# Patient Record
Sex: FEMALE | Race: WHITE | Employment: FULL TIME | URBAN - METROPOLITAN AREA
[De-identification: names, ages, dates, MRNs, and addresses within clinical notes are randomized per-mention and may not be internally consistent; named-entity substitution may affect disease eponyms.]

---

## 2022-10-11 ENCOUNTER — HOSPITAL ENCOUNTER (OUTPATIENT)
Dept: MAMMOGRAPHY | Age: 37
Discharge: HOME | End: 2022-10-14
Payer: COMMERCIAL

## 2022-10-11 DIAGNOSIS — Z12.31 VISIT FOR SCREENING MAMMOGRAM: ICD-10-CM

## 2022-10-11 PROCEDURE — 77067 SCR MAMMO BI INCL CAD: CPT

## 2023-02-13 ENCOUNTER — OFFICE VISIT (OUTPATIENT)
Dept: OCCUPATIONAL MEDICINE | Age: 38
End: 2023-02-13

## 2023-02-13 VITALS — BODY MASS INDEX: 30.31 KG/M2 | WEIGHT: 193.13 LBS | HEIGHT: 67 IN

## 2023-02-13 DIAGNOSIS — Z00.00 WELLNESS EXAMINATION: Primary | ICD-10-CM

## 2023-02-13 RX ORDER — RIZATRIPTAN BENZOATE 10 MG/1
10 TABLET, ORALLY DISINTEGRATING ORAL
COMMUNITY
Start: 2016-08-11

## 2023-02-13 RX ORDER — FOLIC ACID 1 MG/1
1 TABLET ORAL DAILY
COMMUNITY
Start: 2021-03-18

## 2023-02-13 RX ORDER — ALBUTEROL SULFATE 90 UG/1
AEROSOL, METERED RESPIRATORY (INHALATION)
COMMUNITY
Start: 2023-01-30

## 2023-02-13 RX ORDER — PROMETHAZINE HYDROCHLORIDE 25 MG/1
25 TABLET ORAL EVERY 6 HOURS PRN
COMMUNITY
Start: 2022-10-08

## 2023-02-13 RX ORDER — CETIRIZINE HYDROCHLORIDE 10 MG/1
10 TABLET ORAL DAILY
COMMUNITY

## 2023-02-13 RX ORDER — AMITRIPTYLINE HYDROCHLORIDE 10 MG/1
TABLET, FILM COATED ORAL
COMMUNITY
Start: 2023-02-10

## 2023-03-27 ENCOUNTER — OFFICE VISIT (OUTPATIENT)
Dept: OCCUPATIONAL MEDICINE | Age: 38
End: 2023-03-27

## 2023-03-27 VITALS — RESPIRATION RATE: 18 BRPM | HEART RATE: 93 BPM | OXYGEN SATURATION: 100 % | TEMPERATURE: 98 F

## 2023-03-27 DIAGNOSIS — R09.89 UPPER RESPIRATORY SYMPTOM: Primary | ICD-10-CM

## 2023-03-27 DIAGNOSIS — J00 ACUTE RHINITIS: ICD-10-CM

## 2023-03-27 LAB
INFLUENZA A ANTIGEN, POC: NEGATIVE
INFLUENZA B ANTIGEN, POC: NEGATIVE
SARS-COV-2 RNA, POC: NEGATIVE

## 2023-03-27 ASSESSMENT — ENCOUNTER SYMPTOMS
CHEST TIGHTNESS: 0
DIARRHEA: 0
SINUS PRESSURE: 1
VOMITING: 0
RHINORRHEA: 0
SORE THROAT: 1
ABDOMINAL PAIN: 0
COUGH: 1
NAUSEA: 0

## 2023-03-27 NOTE — PROGRESS NOTES
PROGRESS NOTE    SUBJECTIVE:   Joan Evans is a 34 y.o. female seen for ____. Chief Complaint    URI         Patient presents to clinic with complaints of upper respiratory symptoms that began yesterday including scratchy throat, head pressure, congestion, cough, watery eyes, and congestion. She denies fever, chills, body aches, sick contacts, seasonal allergies. She states she took two Zyrtec yesterday and today, Ibuprofen today for her symptoms with some relief. Current Outpatient Medications   Medication Sig Dispense Refill    Multiple Vitamin (MULTIVITAMIN ADULT PO) Take 1 tablet by mouth      albuterol sulfate HFA (PROVENTIL;VENTOLIN;PROAIR) 108 (90 Base) MCG/ACT inhaler       amitriptyline (ELAVIL) 10 MG tablet       cetirizine (ZYRTEC) 10 MG tablet Take 10 mg by mouth daily      folic acid (FOLVITE) 1 MG tablet Take 1 tablet by mouth daily      promethazine (PHENERGAN) 25 MG tablet Take 25 mg by mouth every 6 hours as needed      rizatriptan (MAXALT-MLT) 10 MG disintegrating tablet Take 10 mg by mouth once as needed       No current facility-administered medications for this visit. Allergies   Allergen Reactions    Ciprofloxacin Other (See Comments)    Penicillin G Other (See Comments)     Brother had anaphylactic reaction to PCN. Pt was told to never take it  Brother had anaphylactic reaction to PCN. Pt was told to never take it  Patient was told she had a penicillin allergy as a baby. She does not know what type of reaction she had. Social History     Tobacco Use    Smoking status: Former     Types: Cigarettes    Smokeless tobacco: Never   Vaping Use    Vaping Use: Some days   Substance Use Topics    Alcohol use: Yes    Drug use: Never        Review of Systems   Constitutional:  Negative for chills and fever. HENT:  Positive for congestion, sinus pressure (frontal), sneezing and sore throat (scratchy). Negative for ear discharge, ear pain, postnasal drip and rhinorrhea.

## 2023-03-30 ENCOUNTER — OFFICE VISIT (OUTPATIENT)
Dept: OCCUPATIONAL MEDICINE | Age: 38
End: 2023-03-30

## 2023-03-30 VITALS — TEMPERATURE: 97.8 F | OXYGEN SATURATION: 99 % | HEART RATE: 104 BPM | RESPIRATION RATE: 18 BRPM

## 2023-03-30 DIAGNOSIS — J01.40 ACUTE NON-RECURRENT PANSINUSITIS: Primary | ICD-10-CM

## 2023-03-30 RX ORDER — DOXYCYCLINE HYCLATE 100 MG
100 TABLET ORAL 2 TIMES DAILY
Qty: 10 TABLET | Refills: 0 | Status: SHIPPED | OUTPATIENT
Start: 2023-03-30 | End: 2023-04-04

## 2023-03-30 ASSESSMENT — ENCOUNTER SYMPTOMS
NAUSEA: 0
COUGH: 1
SINUS PRESSURE: 1
DIARRHEA: 0
SORE THROAT: 0
VOMITING: 0
ABDOMINAL PAIN: 0
CHEST TIGHTNESS: 0
SHORTNESS OF BREATH: 0

## 2023-03-30 NOTE — PROGRESS NOTES
No submental or submandibular adenopathy. Cervical: No cervical adenopathy. Skin:     General: Skin is warm and dry. Neurological:      Mental Status: She is alert and oriented to person, place, and time. Psychiatric:         Attention and Perception: Attention and perception normal.         Mood and Affect: Mood and affect normal.         Speech: Speech normal.         Behavior: Behavior normal. Behavior is cooperative. Thought Content: Thought content normal.         Cognition and Memory: Cognition and memory normal.         Judgment: Judgment normal.       ASSESSMENT and PLAN    Tanvi was seen today for follow-up. Diagnoses and all orders for this visit:    Acute non-recurrent pansinusitis  -     doxycycline hyclate (VIBRA-TABS) 100 MG tablet; Take 1 tablet by mouth 2 times daily for 5 days    Discussed with patient that based on history of present illness and persistence of symptoms despite OTC/home management/interventions, antibiotic therapy reasonable at this time. Educated to take as prescribed in its entirety with food to reduce GI upset. Risks, benefits, side effects discussed. Encouraged to continue OTC/home interventions that we previously discussed including Zyrtec and Flonase daily, avoidance of allergens, anti-inflammatory and decongestant conservatively as needed, increased fluid intake, cool mist humidifier, warm steam showers, elevating head of bed at night, cough/throat lozenges, warm fluids with honey. Educated on symptoms that would require more urgent evaluation including but not limited to worsening of symptoms despite antibiotic therapy, fever, shortness of breath, chest tightness. Encouraged to utilize clinic as needed or urgent care/ED if outside of clinic hours. Patient verbalized understanding and is agreeable with the outlined plan of care.      Patient counseled on benefits of having a primary care provider which includes, but is not limited to, continuity of care

## 2023-05-18 ENCOUNTER — OFFICE VISIT (OUTPATIENT)
Dept: OCCUPATIONAL MEDICINE | Age: 38
End: 2023-05-18

## 2023-05-18 VITALS
OXYGEN SATURATION: 97 % | TEMPERATURE: 97.8 F | HEART RATE: 94 BPM | SYSTOLIC BLOOD PRESSURE: 99 MMHG | DIASTOLIC BLOOD PRESSURE: 82 MMHG

## 2023-05-18 DIAGNOSIS — Z00.00 WELLNESS EXAMINATION: Primary | ICD-10-CM

## 2023-05-18 NOTE — PROGRESS NOTES
PROGRESS NOTE    SUBJECTIVE:   Abhishek Grover is a 34 y.o. female seen for quarterly wellness exam.    Chief Complaint    Other         Patient presents to clinic for quarterly wellness screening as required by employer to receive incentive. Patient voices no complaints or concerns at this time. Patient states she has been trying to increase her water intake and is currently drinking 3-4 16 oz water bottles a day. She has been trying to cut out sodas and been successful with that goal. She states since cutting out sodas she has lost some weight and would like to continue this goal.         Current Outpatient Medications   Medication Sig Dispense Refill    Multiple Vitamin (MULTIVITAMIN ADULT PO) Take 1 tablet by mouth      albuterol sulfate HFA (PROVENTIL;VENTOLIN;PROAIR) 108 (90 Base) MCG/ACT inhaler       amitriptyline (ELAVIL) 10 MG tablet       cetirizine (ZYRTEC) 10 MG tablet Take 10 mg by mouth daily      folic acid (FOLVITE) 1 MG tablet Take 1 tablet by mouth daily      promethazine (PHENERGAN) 25 MG tablet Take 25 mg by mouth every 6 hours as needed      rizatriptan (MAXALT-MLT) 10 MG disintegrating tablet Take 10 mg by mouth once as needed       No current facility-administered medications for this visit. Allergies   Allergen Reactions    Ciprofloxacin Other (See Comments)    Penicillin G Other (See Comments)     Brother had anaphylactic reaction to PCN. Pt was told to never take it  Brother had anaphylactic reaction to PCN. Pt was told to never take it  Patient was told she had a penicillin allergy as a baby. She does not know what type of reaction she had. Social History     Tobacco Use    Smoking status: Former     Types: Cigarettes    Smokeless tobacco: Never   Vaping Use    Vaping Use: Some days   Substance Use Topics    Alcohol use: Yes    Drug use: Never        Review of Systems   All other systems reviewed and are negative.        OBJECTIVE:  BP 99/82 (Site: Left Upper Arm,

## 2023-08-10 ENCOUNTER — OFFICE VISIT (OUTPATIENT)
Dept: OCCUPATIONAL MEDICINE | Age: 38
End: 2023-08-10

## 2023-08-10 VITALS — OXYGEN SATURATION: 97 % | SYSTOLIC BLOOD PRESSURE: 116 MMHG | DIASTOLIC BLOOD PRESSURE: 80 MMHG | HEART RATE: 100 BPM

## 2023-08-10 DIAGNOSIS — Z00.00 WELLNESS EXAMINATION: Primary | ICD-10-CM

## 2023-08-10 NOTE — PROGRESS NOTES
PROGRESS NOTE    SUBJECTIVE:   Elza Paz is a 34 y.o. female seen for quarterly wellness exam.    Chief Complaint    Other         HPI    Patient presents to clinic for quarterly wellness screening as required by employer to receive incentive. Patient voices no complaints or concerns at this time. Patient states she is working on cutting out sodas. She tries to only drink sodas when she has a migraine, because the caffeine helps with her symptoms. She is treated by her PCP for migraines with amitriptyline and rizatriptan, which she states give her some relief when she has the migraines. Current Outpatient Medications   Medication Sig Dispense Refill    Multiple Vitamin (MULTIVITAMIN ADULT PO) Take 1 tablet by mouth      albuterol sulfate HFA (PROVENTIL;VENTOLIN;PROAIR) 108 (90 Base) MCG/ACT inhaler       amitriptyline (ELAVIL) 10 MG tablet       cetirizine (ZYRTEC) 10 MG tablet Take 10 mg by mouth daily      folic acid (FOLVITE) 1 MG tablet Take 1 tablet by mouth daily      promethazine (PHENERGAN) 25 MG tablet Take 25 mg by mouth every 6 hours as needed      rizatriptan (MAXALT-MLT) 10 MG disintegrating tablet Take 10 mg by mouth once as needed       No current facility-administered medications for this visit. Allergies   Allergen Reactions    Ciprofloxacin Other (See Comments)    Penicillin G Other (See Comments)     Brother had anaphylactic reaction to PCN. Pt was told to never take it  Brother had anaphylactic reaction to PCN. Pt was told to never take it  Patient was told she had a penicillin allergy as a baby. She does not know what type of reaction she had. Social History     Tobacco Use    Smoking status: Former     Types: Cigarettes    Smokeless tobacco: Never   Vaping Use    Vaping Use: Some days   Substance Use Topics    Alcohol use: Yes    Drug use: Never        Review of Systems   All other systems reviewed and are negative.        OBJECTIVE:  /80   Pulse 100

## 2023-10-16 ENCOUNTER — OFFICE VISIT (OUTPATIENT)
Dept: OCCUPATIONAL MEDICINE | Age: 38
End: 2023-10-16

## 2023-10-16 VITALS
DIASTOLIC BLOOD PRESSURE: 82 MMHG | HEIGHT: 67 IN | WEIGHT: 185.2 LBS | OXYGEN SATURATION: 99 % | SYSTOLIC BLOOD PRESSURE: 116 MMHG | HEART RATE: 98 BPM | BODY MASS INDEX: 29.07 KG/M2

## 2023-10-16 DIAGNOSIS — Z00.00 WELLNESS EXAMINATION: Primary | ICD-10-CM

## 2023-12-13 ENCOUNTER — OFFICE VISIT (OUTPATIENT)
Dept: OCCUPATIONAL MEDICINE | Age: 38
End: 2023-12-13

## 2023-12-13 VITALS
TEMPERATURE: 97.7 F | DIASTOLIC BLOOD PRESSURE: 84 MMHG | OXYGEN SATURATION: 99 % | RESPIRATION RATE: 16 BRPM | SYSTOLIC BLOOD PRESSURE: 124 MMHG | HEART RATE: 80 BPM

## 2023-12-13 DIAGNOSIS — B97.89 SORE THROAT (VIRAL): ICD-10-CM

## 2023-12-13 DIAGNOSIS — J06.9 VIRAL UPPER RESPIRATORY TRACT INFECTION: Primary | ICD-10-CM

## 2023-12-13 DIAGNOSIS — J02.8 SORE THROAT (VIRAL): ICD-10-CM

## 2023-12-13 DIAGNOSIS — G43.909 MIGRAINE WITHOUT STATUS MIGRAINOSUS, NOT INTRACTABLE, UNSPECIFIED MIGRAINE TYPE: ICD-10-CM

## 2023-12-13 RX ORDER — RIZATRIPTAN BENZOATE 10 MG/1
10 TABLET, ORALLY DISINTEGRATING ORAL PRN
Qty: 9 TABLET | Refills: 0 | Status: SHIPPED | OUTPATIENT
Start: 2023-12-13

## 2023-12-13 ASSESSMENT — ENCOUNTER SYMPTOMS
WHEEZING: 0
DIARRHEA: 0
SORE THROAT: 1
ABDOMINAL PAIN: 0
CHEST TIGHTNESS: 1
EYE PAIN: 1
RHINORRHEA: 1
SHORTNESS OF BREATH: 0
SINUS PAIN: 0
VOMITING: 0
COUGH: 1
NAUSEA: 1

## 2024-03-07 ENCOUNTER — OFFICE VISIT (OUTPATIENT)
Age: 39
End: 2024-03-07

## 2024-03-07 VITALS
SYSTOLIC BLOOD PRESSURE: 108 MMHG | DIASTOLIC BLOOD PRESSURE: 78 MMHG | WEIGHT: 192.6 LBS | HEART RATE: 90 BPM | HEIGHT: 66 IN | TEMPERATURE: 98.1 F | BODY MASS INDEX: 30.95 KG/M2 | RESPIRATION RATE: 16 BRPM | OXYGEN SATURATION: 100 %

## 2024-03-07 DIAGNOSIS — Z00.00 WELLNESS EXAMINATION: Primary | ICD-10-CM

## 2024-03-07 NOTE — PROGRESS NOTES
PROGRESS NOTE    SUBJECTIVE:   Tanvi Major is a 39 y.o. female seen for ____.    Chief Complaint    Wellness Program         HPI  Patient presents to clinic for quarterly wellness screening as required by employer to receive incentive. Patient voices no complaints or concerns at this time. Pt expresses her health and wellness goals for this year are to increase physical activity to 2-3 times per week and decrease intake of sweets to lose some weight and feel more \"comfortable\" in her clothes. She states her stress level is somewhat elevated due to some changes at work, but overall it is managable. She states she is sleeping well, feels well rested in the morning and regularly gets a total of 7 hours of sleep per night.       Current Outpatient Medications   Medication Sig Dispense Refill    rizatriptan (MAXALT-MLT) 10 MG disintegrating tablet Take 1 tablet by mouth as needed for Migraine Do not take more than 10 in one month 9 tablet 0    Multiple Vitamin (MULTIVITAMIN ADULT PO) Take 1 tablet by mouth      albuterol sulfate HFA (PROVENTIL;VENTOLIN;PROAIR) 108 (90 Base) MCG/ACT inhaler       cetirizine (ZYRTEC) 10 MG tablet Take 10 mg by mouth daily      folic acid (FOLVITE) 1 MG tablet Take 1 tablet by mouth daily      promethazine (PHENERGAN) 25 MG tablet Take 25 mg by mouth every 6 hours as needed       No current facility-administered medications for this visit.      Allergies   Allergen Reactions    Ciprofloxacin Other (See Comments)    Penicillin G Other (See Comments)     Brother had anaphylactic reaction to PCN.  Pt was told to never take it  Brother had anaphylactic reaction to PCN.  Pt was told to never take it  Patient was told she had a penicillin allergy as a baby. She does not know what type of reaction she had.         Social History     Tobacco Use    Smoking status: Former     Types: Cigarettes    Smokeless tobacco: Never   Vaping Use    Vaping Use: Some days   Substance Use Topics    Alcohol

## 2024-06-20 ENCOUNTER — OFFICE VISIT (OUTPATIENT)
Age: 39
End: 2024-06-20

## 2024-06-20 VITALS
DIASTOLIC BLOOD PRESSURE: 68 MMHG | SYSTOLIC BLOOD PRESSURE: 112 MMHG | BODY MASS INDEX: 30.95 KG/M2 | HEART RATE: 87 BPM | HEIGHT: 66 IN | RESPIRATION RATE: 16 BRPM | OXYGEN SATURATION: 97 % | WEIGHT: 192.6 LBS

## 2024-06-20 DIAGNOSIS — Z00.00 WELLNESS EXAMINATION: Primary | ICD-10-CM

## 2024-06-20 PROBLEM — G89.29 CHRONIC PAIN OF LEFT KNEE: Status: RESOLVED | Noted: 2018-04-11 | Resolved: 2024-06-20

## 2024-06-20 PROBLEM — E53.8 FOLATE DEFICIENCY: Status: ACTIVE | Noted: 2020-10-22

## 2024-06-20 PROBLEM — R73.03 PREDIABETES: Status: RESOLVED | Noted: 2018-04-11 | Resolved: 2024-06-20

## 2024-06-20 PROBLEM — E53.8 VITAMIN B12 DEFICIENCY: Status: ACTIVE | Noted: 2020-10-22

## 2024-06-20 PROBLEM — I95.9 HYPOTENSION: Status: RESOLVED | Noted: 2020-10-22 | Resolved: 2024-06-20

## 2024-06-20 PROBLEM — K85.10 GALLSTONE PANCREATITIS: Status: RESOLVED | Noted: 2018-12-13 | Resolved: 2024-06-20

## 2024-06-20 PROBLEM — J30.9 ALLERGIC RHINITIS: Status: RESOLVED | Noted: 2021-09-28 | Resolved: 2024-06-20

## 2024-06-20 PROBLEM — R05.3 CHRONIC COUGH: Status: RESOLVED | Noted: 2022-04-15 | Resolved: 2024-06-20

## 2024-06-20 PROBLEM — R53.83 FATIGUE: Status: RESOLVED | Noted: 2018-04-11 | Resolved: 2024-06-20

## 2024-06-20 PROBLEM — M25.562 CHRONIC PAIN OF LEFT KNEE: Status: RESOLVED | Noted: 2018-04-11 | Resolved: 2024-06-20

## 2024-06-20 PROBLEM — E66.9 CLASS 1 OBESITY WITH BODY MASS INDEX (BMI) OF 33.0 TO 33.9 IN ADULT: Status: RESOLVED | Noted: 2018-04-11 | Resolved: 2024-06-20

## 2024-06-20 PROBLEM — G43.109 MIGRAINE WITH AURA AND WITHOUT STATUS MIGRAINOSUS, NOT INTRACTABLE: Status: ACTIVE | Noted: 2017-06-29

## 2024-06-20 PROBLEM — E66.811 CLASS 1 OBESITY WITH BODY MASS INDEX (BMI) OF 33.0 TO 33.9 IN ADULT: Status: RESOLVED | Noted: 2018-04-11 | Resolved: 2024-06-20

## 2024-06-20 PROBLEM — F43.22 ADJUSTMENT DISORDER WITH ANXIETY: Status: RESOLVED | Noted: 2019-06-13 | Resolved: 2024-06-20

## 2024-06-20 NOTE — PROGRESS NOTES
PROGRESS NOTE    SUBJECTIVE /HPI  Tanvi Major is a 39 y.o. female who presents to the onsite wellness clinic for her quarterly wellness screening as required by her employer to receive an incentive. She voices no complaints or concerns at this time. She has decreased her soda intake and is now down to one 7oz  can of soda per day.       Chief Complaint    Quarterly Wellness Review             Current Outpatient Medications   Medication Sig Dispense Refill    rizatriptan (MAXALT-MLT) 10 MG disintegrating tablet Take 1 tablet by mouth as needed for Migraine Do not take more than 10 in one month 9 tablet 0    cetirizine (ZYRTEC) 10 MG tablet Take 1 tablet by mouth daily      promethazine (PHENERGAN) 25 MG tablet Take 1 tablet by mouth every 6 hours as needed      Multiple Vitamin (MULTIVITAMIN ADULT PO) Take 1 tablet by mouth (Patient not taking: Reported on 6/20/2024)      albuterol sulfate HFA (PROVENTIL;VENTOLIN;PROAIR) 108 (90 Base) MCG/ACT inhaler  (Patient not taking: Reported on 6/20/2024)      folic acid (FOLVITE) 1 MG tablet Take 1 tablet by mouth daily (Patient not taking: Reported on 6/20/2024)       No current facility-administered medications for this visit.      Allergies   Allergen Reactions    Ciprofloxacin Other (See Comments)    Penicillin G Other (See Comments)     Brother had anaphylactic reaction to PCN.  Pt was told to never take it  Brother had anaphylactic reaction to PCN.  Pt was told to never take it  Patient was told she had a penicillin allergy as a baby. She does not know what type of reaction she had.         Social History     Tobacco Use    Smoking status: Former     Types: Cigarettes    Smokeless tobacco: Never   Vaping Use    Vaping Use: Some days   Substance Use Topics    Alcohol use: Yes    Drug use: Never        Review of Systems   Constitutional: Negative.    Respiratory: Negative.     Cardiovascular: Negative.           OBJECTIVE:  /68 (Site: Right Upper Arm,

## 2024-07-25 ENCOUNTER — OFFICE VISIT (OUTPATIENT)
Age: 39
End: 2024-07-25

## 2024-07-25 VITALS
HEIGHT: 66 IN | BODY MASS INDEX: 30.89 KG/M2 | SYSTOLIC BLOOD PRESSURE: 102 MMHG | WEIGHT: 192.2 LBS | DIASTOLIC BLOOD PRESSURE: 78 MMHG

## 2024-07-25 DIAGNOSIS — Z00.8 ENCOUNTER FOR BIOMETRIC SCREENING: Primary | ICD-10-CM

## 2024-07-25 ASSESSMENT — ENCOUNTER SYMPTOMS: RESPIRATORY NEGATIVE: 1

## 2024-07-25 NOTE — PROGRESS NOTES
PROGRESS NOTE    SUBJECTIVEHPI:   Tanvi Major is a 39 y.o. female seen in the onsite clinic at her place of employment for an annual biometric screening. 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         Current Outpatient Medications   Medication Sig Dispense Refill    rizatriptan (MAXALT-MLT) 10 MG disintegrating tablet Take 1 tablet by mouth as needed for Migraine Do not take more than 10 in one month 9 tablet 0    promethazine (PHENERGAN) 25 MG tablet Take 1 tablet by mouth every 6 hours as needed       No current facility-administered medications for this visit.      Allergies   Allergen Reactions    Ciprofloxacin Other (See Comments)    Penicillin G Other (See Comments)     Brother had anaphylactic reaction to PCN.  Pt was told to never take it  Brother had anaphylactic reaction to PCN.  Pt was told to never take it  Patient was told she had a penicillin allergy as a baby. She does not know what type of reaction she had.         Social History     Tobacco Use    Smoking status: Former     Types: Cigarettes    Smokeless tobacco: Never   Vaping Use    Vaping Use: Some days   Substance Use Topics    Alcohol use: Yes    Drug use: Never        Review of Systems   Constitutional: Negative.    Respiratory: Negative.     Cardiovascular: Negative.    Psychiatric/Behavioral:          Increased stress due to work changes          OBJECTIVE:  /78 (Site: Left Upper Arm, Position: Sitting, Cuff Size: Medium Adult)   Ht 1.683 m (5' 6.25\")   Wt 87.2 kg (192 lb 3.2 oz)   BMI 30.79 kg/m²    Additional Measurements    07/25/24 0831   Waist (Inches): 40 in         Physical Exam  Vitals reviewed.   Constitutional:       General: She is awake. She is not in acute distress.     Appearance: Normal appearance. She is well-developed and well-groomed.   Pulmonary:      Effort: Pulmonary effort is normal.   Skin:

## 2024-07-26 LAB
ALBUMIN SERPL-MCNC: 4.1 G/DL (ref 3.9–4.9)
ALP SERPL-CCNC: 85 IU/L (ref 44–121)
ALT SERPL-CCNC: 16 IU/L (ref 0–32)
AST SERPL-CCNC: 15 IU/L (ref 0–40)
BASOPHILS # BLD AUTO: 0 X10E3/UL (ref 0–0.2)
BASOPHILS NFR BLD AUTO: 0 %
BILIRUB SERPL-MCNC: 0.3 MG/DL (ref 0–1.2)
BUN SERPL-MCNC: 8 MG/DL (ref 6–24)
BUN/CREAT SERPL: 10 (ref 9–23)
CALCIUM SERPL-MCNC: 8.9 MG/DL (ref 8.7–10.2)
CHLORIDE SERPL-SCNC: 106 MMOL/L (ref 96–106)
CHOLEST SERPL-MCNC: 239 MG/DL (ref 100–199)
CHOLEST/HDLC SERPL: 6.1 RATIO (ref 0–4.4)
CO2 SERPL-SCNC: 19 MMOL/L (ref 20–29)
CREAT SERPL-MCNC: 0.8 MG/DL (ref 0.57–1)
EGFRCR SERPLBLD CKD-EPI 2021: 95 ML/MIN/1.73
EOSINOPHIL # BLD AUTO: 0.1 X10E3/UL (ref 0–0.4)
EOSINOPHIL NFR BLD AUTO: 1 %
ERYTHROCYTE [DISTWIDTH] IN BLOOD BY AUTOMATED COUNT: 13.1 % (ref 11.7–15.4)
GLOBULIN SER CALC-MCNC: 3 G/DL (ref 1.5–4.5)
GLUCOSE SERPL-MCNC: 73 MG/DL (ref 70–99)
HBA1C MFR BLD: 5.3 % (ref 4.8–5.6)
HCT VFR BLD AUTO: 42 % (ref 34–46.6)
HDLC SERPL-MCNC: 39 MG/DL
HGB BLD-MCNC: 14 G/DL (ref 11.1–15.9)
IMM GRANULOCYTES # BLD AUTO: 0 X10E3/UL (ref 0–0.1)
IMM GRANULOCYTES NFR BLD AUTO: 0 %
LDLC SERPL CALC-MCNC: 157 MG/DL (ref 0–99)
LYMPHOCYTES # BLD AUTO: 2.2 X10E3/UL (ref 0.7–3.1)
LYMPHOCYTES NFR BLD AUTO: 30 %
MCH RBC QN AUTO: 28.5 PG (ref 26.6–33)
MCHC RBC AUTO-ENTMCNC: 33.3 G/DL (ref 31.5–35.7)
MCV RBC AUTO: 86 FL (ref 79–97)
MONOCYTES # BLD AUTO: 0.5 X10E3/UL (ref 0.1–0.9)
MONOCYTES NFR BLD AUTO: 7 %
NEUTROPHILS # BLD AUTO: 4.4 X10E3/UL (ref 1.4–7)
NEUTROPHILS NFR BLD AUTO: 62 %
PLATELET # BLD AUTO: 293 X10E3/UL (ref 150–450)
POTASSIUM SERPL-SCNC: 4.2 MMOL/L (ref 3.5–5.2)
PROT SERPL-MCNC: 7.1 G/DL (ref 6–8.5)
RBC # BLD AUTO: 4.91 X10E6/UL (ref 3.77–5.28)
SODIUM SERPL-SCNC: 139 MMOL/L (ref 134–144)
TRIGL SERPL-MCNC: 233 MG/DL (ref 0–149)
TSH SERPL DL<=0.005 MIU/L-ACNC: 2.71 UIU/ML (ref 0.45–4.5)
VLDLC SERPL CALC-MCNC: 43 MG/DL (ref 5–40)
WBC # BLD AUTO: 7.2 X10E3/UL (ref 3.4–10.8)

## 2024-08-01 ENCOUNTER — OFFICE VISIT (OUTPATIENT)
Age: 39
End: 2024-08-01

## 2024-08-01 DIAGNOSIS — Z00.00 WELLNESS EXAMINATION: Primary | ICD-10-CM

## 2024-08-01 DIAGNOSIS — E78.5 HYPERLIPIDEMIA, UNSPECIFIED HYPERLIPIDEMIA TYPE: ICD-10-CM

## 2024-08-01 NOTE — PROGRESS NOTES
orders placed or performed in visit on 07/25/24   Comprehensive Metabolic Panel   Result Value Ref Range    Glucose 73 70 - 99 mg/dL    BUN 8 6 - 24 mg/dL    Creatinine 0.80 0.57 - 1.00 mg/dL    EstJoshua Filt Rate 95 >59 mL/min/1.73    BUN/Creatinine Ratio 10 9 - 23    Sodium 139 134 - 144 mmol/L    Potassium 4.2 3.5 - 5.2 mmol/L    Chloride 106 96 - 106 mmol/L    CO2 19 (L) 20 - 29 mmol/L    Calcium 8.9 8.7 - 10.2 mg/dL    Total Protein 7.1 6.0 - 8.5 g/dL    Albumin 4.1 3.9 - 4.9 g/dL    Globulin, Total 3.0 1.5 - 4.5 g/dL    Total Bilirubin 0.3 0.0 - 1.2 mg/dL    Alkaline Phosphatase 85 44 - 121 IU/L    AST 15 0 - 40 IU/L    ALT 16 0 - 32 IU/L   CBC with Auto Differential   Result Value Ref Range    WBC 7.2 3.4 - 10.8 x10E3/uL    RBC 4.91 3.77 - 5.28 x10E6/uL    Hemoglobin 14.0 11.1 - 15.9 g/dL    Hematocrit 42.0 34.0 - 46.6 %    MCV 86 79 - 97 fL    MCH 28.5 26.6 - 33.0 pg    MCHC 33.3 31.5 - 35.7 g/dL    RDW 13.1 11.7 - 15.4 %    Platelets 293 150 - 450 x10E3/uL    Neutrophils % 62 Not Estab. %    Lymphocytes % 30 Not Estab. %    Monocytes % 7 Not Estab. %    Eosinophils % 1 Not Estab. %    Basophils % 0 Not Estab. %    Neutrophils Absolute 4.4 1.4 - 7.0 x10E3/uL    Lymphocytes Absolute 2.2 0.7 - 3.1 x10E3/uL    Monocytes Absolute 0.5 0.1 - 0.9 x10E3/uL    Eosinophils Absolute 0.1 0.0 - 0.4 x10E3/uL    Basophils Absolute 0.0 0.0 - 0.2 x10E3/uL    Immature Granulocytes % 0 Not Estab. %    Immature Grans (Abs) 0.0 0.0 - 0.1 x10E3/uL   Lipid Panel W/ Chol/HDL Ratio   Result Value Ref Range    Cholesterol, Total 239 (H) 100 - 199 mg/dL    Triglycerides 233 (H) 0 - 149 mg/dL    HDL 39 (L) >39 mg/dL    VLDL Cholesterol Calculated 43 (H) 5 - 40 mg/dL    LDL Cholesterol 157 (H) 0 - 99 mg/dL    Chol/HDL Ratio 6.1 (H) 0.0 - 4.4 ratio   Hemoglobin A1C   Result Value Ref Range    Hemoglobin A1C 5.3 4.8 - 5.6 %   Thyroid Cascade Profile   Result Value Ref Range    TSH 2.710 0.450 - 4.500 uIU/mL         Physical Exam  Vitals

## 2024-08-02 ASSESSMENT — ENCOUNTER SYMPTOMS: RESPIRATORY NEGATIVE: 1

## 2024-10-28 ENCOUNTER — OFFICE VISIT (OUTPATIENT)
Age: 39
End: 2024-10-28

## 2024-10-28 VITALS — DIASTOLIC BLOOD PRESSURE: 64 MMHG | SYSTOLIC BLOOD PRESSURE: 102 MMHG

## 2024-10-28 DIAGNOSIS — Z00.00 WELLNESS EXAMINATION: Primary | ICD-10-CM

## 2024-10-28 DIAGNOSIS — Z76.0 MEDICATION REFILL: ICD-10-CM

## 2024-10-28 DIAGNOSIS — G43.901 MIGRAINE WITH STATUS MIGRAINOSUS, NOT INTRACTABLE, UNSPECIFIED MIGRAINE TYPE: ICD-10-CM

## 2024-10-28 RX ORDER — RIZATRIPTAN BENZOATE 10 MG/1
10 TABLET, ORALLY DISINTEGRATING ORAL PRN
Qty: 9 TABLET | Refills: 0 | Status: SHIPPED | OUTPATIENT
Start: 2024-10-28

## 2024-10-28 ASSESSMENT — ENCOUNTER SYMPTOMS: RESPIRATORY NEGATIVE: 1

## 2024-10-28 NOTE — PROGRESS NOTES
wellness program with Tanvi. Discussed all pertinent clinical examination findings/results with her applicable. she voiced no questions or concerns at present. Encouraged to return to clinic as needed.     I have reviewed the Tanvi medication list, past medical, family, social, and surgical history in detail and updated the patient record appropriately.    Judy Chance, APRN - CNP

## 2024-10-28 NOTE — PATIENT INSTRUCTIONS
Increased intake of fruits and vegetables is linked to a lower risk of premature death as well as heart disease and stroke.  High intake of fruits and vegetables may reduce the risk of developing cancer. Work on increasing your fruit and vegetable servings to at least five servings daily.     Whole-grain foods (like 100 percent whole-wheat bread, steel cut oats, and whole-grain pasta) are healthier choices than foods made with refined grains (like white bread and white rice). Regularly eating whole grains has been shown to help with weight control and to lower the risk of a range of conditions, including colorectal cancer, cardiovascular disease, and diabetes.     Healthy fats -- There are different types of dietary fat; in terms of health benefits, the type of fat in the diet is more important than the overall amount. \"Healthy\" fats are monounsaturated or polyunsaturated fats. These are found in nuts, seeds, avocados, and nut butters, as well as olive, canola, and sesame oil. Eating foods with healthy fats, while avoiding or limiting foods with saturated and trans fat may reduce the risk of heart disease.     Added sugars -- Eating foods with a lot of added sugars, including sugar-sweetened beverages like fruit drinks, sugary sodas, sweet tea, and sports drinks, increases the risk of heart disease, diabetes, and high blood pressure. It also contributes to weight gain. While 100 percent fruit juice has natural sugars and some extra nutrients, it has around as many calories as sugary soda. Drinking water and then eating whole fruit instead is the best choice.     Processed foods -- Processed foods include pre-packaged items like snack foods, frozen meals, and instant soups. They are often high in refined grains, sugar, unhealthy fats, and salt, and low in other important nutrients. Eating a lot of processed foods may increase the risk of heart disease, diabetes, fatty liver disease, and cancer.

## 2025-02-24 ENCOUNTER — OFFICE VISIT (OUTPATIENT)
Age: 40
End: 2025-02-24

## 2025-02-24 VITALS
HEART RATE: 92 BPM | SYSTOLIC BLOOD PRESSURE: 102 MMHG | TEMPERATURE: 98.3 F | DIASTOLIC BLOOD PRESSURE: 70 MMHG | RESPIRATION RATE: 16 BRPM | OXYGEN SATURATION: 98 %

## 2025-02-24 DIAGNOSIS — M25.512 ACUTE PAIN OF LEFT SHOULDER: Primary | ICD-10-CM

## 2025-02-24 DIAGNOSIS — R52 PAIN AGGRAVATED BY EATING OR DRINKING: ICD-10-CM

## 2025-02-24 LAB — GLUCOSE, POC: 111 MG/DL

## 2025-02-24 ASSESSMENT — ENCOUNTER SYMPTOMS
SHORTNESS OF BREATH: 0
ABDOMINAL PAIN: 0
BACK PAIN: 0
BLOOD IN STOOL: 0
DIARRHEA: 0
CHEST TIGHTNESS: 0
VOMITING: 1
NAUSEA: 1
RESPIRATORY NEGATIVE: 1
WHEEZING: 0
RECTAL PAIN: 0
EYES NEGATIVE: 1
CONSTIPATION: 0
ABDOMINAL DISTENTION: 0
ANAL BLEEDING: 0

## 2025-02-24 NOTE — PROGRESS NOTES
[employer]  ONSITE CLINIC  PROGRESS NOTE    SUBJECTIVE:   Tanvi Major is a 40 y.o. female seen here in the onsite clinic at her place of employment, [employer]. she has a Primary Care Provider that she sees regularly, Mulvihill, Joseph, MD.      Chief Complaint    Shoulder Pain           Shoulder Pain   Pertinent negatives include no fever or numbness.     Tanvi reports intermittent pain in her left shoulder after eating. She reports the pain started about one month ago and occurs approximately once a day. It starts about 5 minutes after she finishes eating and last for about 10-15 minutes and occasionally has some nausea and vomits with the pain, but not consistently. Other then eating no other causative factors known. The last time the pain accord was after lunch today, 1.5 hours ago, she denies pain currently. For lunch she had a spinach dip and corn chips. She is not treating the pain and it just resolves on its own. The pain does not radiate and she denies a history of hypertension or  heart disease. She reports having borderline blood sugars years ago that are now controled with a healthier diet.    Denies known injury, weakness, numbness/tingling, shortness of breath, chest pain, palpitations, fever/chills, reflux/heartburn, abdominal pain/diarrhea/constipation/bloating     Current Outpatient Medications   Medication Sig Dispense Refill    rizatriptan (MAXALT-MLT) 10 MG disintegrating tablet Take 1 tablet by mouth as needed for Migraine if symptoms persist or return, may repeat dose after >=2 hours. Do not take more than 2 tablets in 24 hours. Limit use to <10 days per month to avoid medication-overuse headache. 9 tablet 0    promethazine (PHENERGAN) 25 MG tablet Take 1 tablet by mouth every 6 hours as needed (Patient not taking: Reported on 2/24/2025)       No current facility-administered medications for this visit.      Allergies   Allergen Reactions    Ciprofloxacin Other (See

## 2025-02-27 ENCOUNTER — OFFICE VISIT (OUTPATIENT)
Age: 40
End: 2025-02-27

## 2025-02-27 DIAGNOSIS — Z01.89 ROUTINE LAB DRAW: ICD-10-CM

## 2025-02-27 DIAGNOSIS — Z90.49 HISTORY OF CHOLECYSTECTOMY: ICD-10-CM

## 2025-02-27 DIAGNOSIS — R10.9 ABDOMINAL PAIN, UNSPECIFIED ABDOMINAL LOCATION: Primary | ICD-10-CM

## 2025-02-27 NOTE — PROGRESS NOTES
[facility]  ONSITE CLINIC  PROGRESS NOTE    SUBJECTIVE:   Tanvi Major is a 40 y.o. female seen here in the onsite clinic at her place of employment, [employer]. she does have a Primary Care Provider that she sees regularly, Mulvihill, Joseph, MD. She was seen here on Monday and the NP encouraged her to return to clinic for fasting labs and to call her Primary Care Provider for further exam. She hasn't called yet because she wanted to see if she wanted to see if her labs showed anything. She likes her Primary Care Provider but has to take 1/2 day off of work when she sees him because he is in [place]. Her symptoms include left shoulder pain after eating that starts about 5 minutes after she finishes eating and last for about 10-15 minutes. She sometimes also has bad nausea approximately 2 hours after eating that is resolved with vomiting. She is fasting this morning and has no pain, nausea, vomiting. Her exam on Monday was unremarkable. She had her gallbladder removed on 12/14/2018 2 days after being admitted to the hospital for gallstone pancreatitis. Prior to that she never had problems with her gallbladder or stomach so was never established with a GI doctor.     Chief Complaint    Labs Only       Current Outpatient Medications   Medication Sig Dispense Refill    rizatriptan (MAXALT-MLT) 10 MG disintegrating tablet Take 1 tablet by mouth as needed for Migraine if symptoms persist or return, may repeat dose after >=2 hours. Do not take more than 2 tablets in 24 hours. Limit use to <10 days per month to avoid medication-overuse headache. 9 tablet 0    promethazine (PHENERGAN) 25 MG tablet Take 1 tablet by mouth every 6 hours as needed (Patient not taking: Reported on 2/24/2025)       No current facility-administered medications for this visit.      Allergies   Allergen Reactions    Ciprofloxacin Other (See Comments)    Penicillin G Other (See Comments)     Brother had anaphylactic

## 2025-02-28 ENCOUNTER — TELEPHONE (OUTPATIENT)
Age: 40
End: 2025-02-28

## 2025-02-28 VITALS — DIASTOLIC BLOOD PRESSURE: 64 MMHG | SYSTOLIC BLOOD PRESSURE: 102 MMHG

## 2025-02-28 LAB
ALBUMIN SERPL-MCNC: 4.4 G/DL (ref 3.9–4.9)
ALP SERPL-CCNC: 80 IU/L (ref 44–121)
ALT SERPL-CCNC: 10 IU/L (ref 0–32)
AST SERPL-CCNC: 13 IU/L (ref 0–40)
BILIRUB DIRECT SERPL-MCNC: 0.13 MG/DL (ref 0–0.4)
BILIRUB SERPL-MCNC: 0.5 MG/DL (ref 0–1.2)
BUN SERPL-MCNC: 10 MG/DL (ref 6–24)
BUN/CREAT SERPL: 12 (ref 9–23)
CALCIUM SERPL-MCNC: 8.9 MG/DL (ref 8.7–10.2)
CHLORIDE SERPL-SCNC: 106 MMOL/L (ref 96–106)
CHOLEST SERPL-MCNC: 233 MG/DL (ref 100–199)
CHOLEST/HDLC SERPL: 5.3 RATIO (ref 0–4.4)
CO2 SERPL-SCNC: 19 MMOL/L (ref 20–29)
CREAT SERPL-MCNC: 0.81 MG/DL (ref 0.57–1)
EGFRCR SERPLBLD CKD-EPI 2021: 93 ML/MIN/1.73
GLUCOSE SERPL-MCNC: 77 MG/DL (ref 70–99)
HDLC SERPL-MCNC: 44 MG/DL
LDLC SERPL CALC-MCNC: 163 MG/DL (ref 0–99)
POTASSIUM SERPL-SCNC: 4.4 MMOL/L (ref 3.5–5.2)
PROT SERPL-MCNC: 7.3 G/DL (ref 6–8.5)
SODIUM SERPL-SCNC: 139 MMOL/L (ref 134–144)
TRIGL SERPL-MCNC: 145 MG/DL (ref 0–149)
VLDLC SERPL CALC-MCNC: 26 MG/DL (ref 5–40)

## 2025-02-28 ASSESSMENT — ENCOUNTER SYMPTOMS
RESPIRATORY NEGATIVE: 1
NAUSEA: 0
VOMITING: 0

## 2025-02-28 NOTE — PATIENT INSTRUCTIONS
Return to clinic on Monday for review of labs. Call clinic in one week if you haven't heard from GI.

## 2025-03-03 ENCOUNTER — OFFICE VISIT (OUTPATIENT)
Age: 40
End: 2025-03-03

## 2025-03-03 VITALS — SYSTOLIC BLOOD PRESSURE: 98 MMHG | HEART RATE: 82 BPM | DIASTOLIC BLOOD PRESSURE: 60 MMHG

## 2025-03-03 DIAGNOSIS — Z71.2 ENCOUNTER TO DISCUSS TEST RESULTS: Primary | ICD-10-CM

## 2025-03-03 RX ORDER — FOLIC ACID 1 MG/1
1000 TABLET ORAL DAILY
COMMUNITY

## 2025-03-03 ASSESSMENT — ENCOUNTER SYMPTOMS
GASTROINTESTINAL NEGATIVE: 1
RESPIRATORY NEGATIVE: 1

## 2025-03-03 NOTE — PATIENT INSTRUCTIONS
Increased intake of fruits and vegetables is linked to a lower risk of premature death as well as heart disease and stroke.  High intake of fruits and vegetables may reduce the risk of developing cancer. Work on increasing your fruit and vegetable servings to at least five servings daily.     Whole-grain foods (like 100 percent whole-wheat bread, steel cut oats, and whole-grain pasta) are healthier choices than foods made with refined grains (like white bread and white rice). Regularly eating whole grains has been shown to help with weight control and to lower the risk of a range of conditions, including colorectal cancer, cardiovascular disease, and diabetes.     Healthy fats -- There are different types of dietary fat; in terms of health benefits, the type of fat in the diet is more important than the overall amount. \"Healthy\" fats are monounsaturated or polyunsaturated fats. These are found in nuts, seeds, avocados, and nut butters, as well as olive, canola, and sesame oil. Eating foods with healthy fats, while avoiding or limiting foods with saturated and trans fat may reduce the risk of heart disease.     Added sugars -- Eating foods with a lot of added sugars, including sugar-sweetened beverages like fruit drinks, sugary sodas, sweet tea, and sports drinks, increases the risk of heart disease, diabetes, and high blood pressure. It also contributes to weight gain. While 100 percent fruit juice has natural sugars and some extra nutrients, it has around as many calories as sugary soda. Drinking water and then eating whole fruit instead is the best choice.     Processed foods -- Processed foods include pre-packaged items like snack foods, frozen meals, and instant soups. They are often high in refined grains, sugar, unhealthy fats, and salt, and low in other important nutrients. Eating a lot of processed foods may increase the risk of heart disease, diabetes, fatty liver disease, and cancer.     Discussed signs

## 2025-03-03 NOTE — PROGRESS NOTES
[employer] [facility]  ONSITE CLINIC  PROGRESS NOTE    SUBJECTIVE:   Tanvi Major is a 40 y.o. female seen here in the onsite clinic at her place of employment, [employer]. she does have a Primary Care Provider that she sees regularly, Mulvihill, Joseph, MD. She came in today to review the labs she had drawn last week. She got a call about scheduling a GI appointment on Friday but when she called back the office was closed. She says she will follow up today or tomorrow. She has no complaints at present.     Chief Complaint    Results         Current Outpatient Medications   Medication Sig Dispense Refill    rizatriptan (MAXALT-MLT) 10 MG disintegrating tablet Take 1 tablet by mouth as needed for Migraine if symptoms persist or return, may repeat dose after >=2 hours. Do not take more than 2 tablets in 24 hours. Limit use to <10 days per month to avoid medication-overuse headache. 9 tablet 0    promethazine (PHENERGAN) 25 MG tablet Take 1 tablet by mouth every 6 hours as needed      folic acid (FOLVITE) 1 MG tablet Take 1 tablet by mouth daily       No current facility-administered medications for this visit.      Allergies   Allergen Reactions    Ciprofloxacin Other (See Comments)    Penicillin G Other (See Comments)     Brother had anaphylactic reaction to PCN.  Pt was told to never take it  Brother had anaphylactic reaction to PCN.  Pt was told to never take it  Patient was told she had a penicillin allergy as a baby. She does not know what type of reaction she had.         Social History     Tobacco Use    Smoking status: Former     Types: Cigarettes    Smokeless tobacco: Never   Vaping Use    Vaping status: Some Days   Substance Use Topics    Alcohol use: Yes    Drug use: Never        Review of Systems   Constitutional: Negative.    Respiratory: Negative.     Cardiovascular: Negative.    Gastrointestinal: Negative.           OBJECTIVE:  BP 98/60 (Site: Left Upper Arm, Position: Sitting, Cuff Size:

## 2025-04-07 ENCOUNTER — RESULTS FOLLOW-UP (OUTPATIENT)
Age: 40
End: 2025-04-07

## 2025-04-07 NOTE — TELEPHONE ENCOUNTER
I called Tanvi via phone to make sure she was aware of the plan of care recommended by the gastroenterologist. She said she has the MRI scheduled and also has a follow up visit with them scheduled. She started the new medication one week ago. She is out sick today with nausea vomiting and doesn't know if she caught something or if she is sick due to eating too many things with high fat content yesterday. I encouraged her to stick with a clear liquid diet and advance as tolerated and also to follow up with her GI doctor if symptoms persist or worsen.

## 2025-06-23 ENCOUNTER — OFFICE VISIT (OUTPATIENT)
Age: 40
End: 2025-06-23

## 2025-06-23 VITALS — SYSTOLIC BLOOD PRESSURE: 98 MMHG | DIASTOLIC BLOOD PRESSURE: 68 MMHG | HEART RATE: 100 BPM

## 2025-06-23 DIAGNOSIS — Z00.00 WELLNESS EXAMINATION: Primary | ICD-10-CM

## 2025-06-23 PROBLEM — E78.5 DYSLIPIDEMIA: Status: ACTIVE | Noted: 2024-11-14

## 2025-06-23 PROBLEM — K90.89 BILE ACID MALABSORPTION SYNDROME: Status: ACTIVE | Noted: 2025-06-23

## 2025-06-23 RX ORDER — FAMOTIDINE 20 MG/1
20 TABLET, FILM COATED ORAL DAILY
COMMUNITY
Start: 2025-03-30

## 2025-06-23 RX ORDER — COLESTIPOL HYDROCHLORIDE 1 G/1
1 TABLET ORAL 2 TIMES DAILY
COMMUNITY
Start: 2025-03-31

## 2025-06-23 NOTE — PATIENT INSTRUCTIONS
Follow up with your primary care provider if you have palpitations again or notice something different with your heartbeat.  Call 911 anytime you think you may need emergency care. For example, call if:  You passed out (lost consciousness).  Call your doctor now or seek immediate medical care if:  You are dizzy or lightheaded, or you feel like you may faint.

## 2025-06-23 NOTE — PROGRESS NOTES
[facility]  Onsite Clinic  PROGRESS NOTE    Subjective   Tanvi Major is a 40 y.o. female who presents to the onsite wellness clinic at her place of employment, [employer] for her quarterly wellness screening as required by her employer to receive an incentive. Tanvi voices no complaints or concerns at this time. she does have a Primary Care Provider that he sees regularly, Mulvihill, Joseph, MD. her last quarterly wellness goal was to increase her daily servings of fruits and vegetables from an average of 2 servings daily to 3-4 servings daily. Says she is still only having 2 per day. She would like to work on this again and increase servings to at least 3 per day.  Says she was told her heart rate was high at the dentist last week and it was 102. When asked about palpitations she said she sometimes feels something at night but doesn't know if it's palpitations. Denies chest pain, shortness of breath, dizziness. In March she went to the ER for palpitations and near syncope. Says they discussed doing a monitor but didn't due to it being an isolated incident.   Chief Complaint    Wellness Program       Current Outpatient Medications   Medication Sig Dispense Refill    colestipol (COLESTID) 1 g tablet Take 1 tablet by mouth 2 times daily      famotidine (PEPCID) 20 MG tablet Take 1 tablet by mouth daily      folic acid (FOLVITE) 1 MG tablet Take 1 tablet by mouth daily      rizatriptan (MAXALT-MLT) 10 MG disintegrating tablet Take 1 tablet by mouth as needed for Migraine if symptoms persist or return, may repeat dose after >=2 hours. Do not take more than 2 tablets in 24 hours. Limit use to <10 days per month to avoid medication-overuse headache. 9 tablet 0    promethazine (PHENERGAN) 25 MG tablet Take 1 tablet by mouth every 6 hours as needed       No current facility-administered medications for this visit.      Allergies   Allergen Reactions    Ciprofloxacin Other (See Comments)

## 2025-07-24 ENCOUNTER — OFFICE VISIT (OUTPATIENT)
Age: 40
End: 2025-07-24

## 2025-07-24 VITALS
BODY MASS INDEX: 31.12 KG/M2 | SYSTOLIC BLOOD PRESSURE: 98 MMHG | WEIGHT: 193.6 LBS | OXYGEN SATURATION: 98 % | RESPIRATION RATE: 16 BRPM | TEMPERATURE: 98.2 F | HEART RATE: 78 BPM | HEIGHT: 66 IN | DIASTOLIC BLOOD PRESSURE: 62 MMHG

## 2025-07-24 DIAGNOSIS — R10.31 RIGHT LOWER QUADRANT PAIN: ICD-10-CM

## 2025-07-24 DIAGNOSIS — Z00.8 ENCOUNTER FOR BIOMETRIC SCREENING: Primary | ICD-10-CM

## 2025-07-24 LAB
BILIRUBIN, URINE, POC: NEGATIVE
BLOOD URINE, POC: NEGATIVE
GLUCOSE URINE, POC: NEGATIVE
HCG QUALITATIVE, POC: NEGATIVE
HCG, PREGNANCY, URINE, POC: NEGATIVE
KETONES, URINE, POC: NEGATIVE
LEUKOCYTE ESTERASE, URINE, POC: NEGATIVE
NITRITE, URINE, POC: NEGATIVE
PH, URINE, POC: 6.5 (ref 4.6–8)
PROTEIN,URINE, POC: NEGATIVE
SPECIFIC GRAVITY, URINE, POC: 1.03 (ref 1–1.03)
URINALYSIS CLARITY, POC: CLEAR
URINALYSIS COLOR, POC: NORMAL
UROBILINOGEN, POC: NORMAL MG/DL
VALID INTERNAL CONTROL, POC: YES

## 2025-07-24 ASSESSMENT — ENCOUNTER SYMPTOMS
ABDOMINAL PAIN: 1
SHORTNESS OF BREATH: 0
NAUSEA: 0
VOMITING: 0

## 2025-07-24 NOTE — PATIENT INSTRUCTIONS
Call your primary care provider now as discussed and go to urgent care if you are unable to be seen.     Seek emergent care if:  You passed out (lost consciousness).   You pass maroon or very bloody stools.   You vomit blood or what looks like coffee grounds.   You have severe belly pain.

## 2025-07-24 NOTE — PROGRESS NOTES
[facility]  ONSITE CLINIC  PROGRESS NOTE    Subjective    Tanvi Major is a 40 y.o. female seen in the onsite clinic at her place of employment, [facility] for an annual biometric screening as part of her workplace wellness program.    Tanvi said several days ago she had nausea and vomiting, a low grade fever, and the right lower quadrant pain. Originally it was a 10/10. N/V and fever have resolved but she still has the pain and it's a 1/10. She is wondering if she needs an ultrasound.   Chief Complaint    Wellness Program       Current Outpatient Medications   Medication Sig Dispense Refill    colestipol (COLESTID) 1 g tablet Take 1 tablet by mouth 2 times daily      famotidine (PEPCID) 20 MG tablet Take 1 tablet by mouth daily      folic acid (FOLVITE) 1 MG tablet Take 1 tablet by mouth daily      rizatriptan (MAXALT-MLT) 10 MG disintegrating tablet Take 1 tablet by mouth as needed for Migraine if symptoms persist or return, may repeat dose after >=2 hours. Do not take more than 2 tablets in 24 hours. Limit use to <10 days per month to avoid medication-overuse headache. 9 tablet 0    promethazine (PHENERGAN) 25 MG tablet Take 1 tablet by mouth every 6 hours as needed       No current facility-administered medications for this visit.      Allergies   Allergen Reactions    Ciprofloxacin Other (See Comments)    Penicillin G Other (See Comments)     Brother had anaphylactic reaction to PCN.  Pt was told to never take it  Brother had anaphylactic reaction to PCN.  Pt was told to never take it  Patient was told she had a penicillin allergy as a baby. She does not know what type of reaction she had.       Social History     Tobacco Use    Smoking status: Former     Types: Cigarettes    Smokeless tobacco: Never   Vaping Use    Vaping status: Some Days   Substance Use Topics    Alcohol use: Yes    Drug use: Never      Review of Systems   Constitutional:  Positive for fatigue. Negative for chills,

## 2025-07-25 LAB
ALBUMIN SERPL-MCNC: 4.2 G/DL (ref 3.9–4.9)
ALP SERPL-CCNC: 80 IU/L (ref 44–121)
ALT SERPL-CCNC: 12 IU/L (ref 0–32)
AST SERPL-CCNC: 14 IU/L (ref 0–40)
BASOPHILS # BLD AUTO: 0 X10E3/UL (ref 0–0.2)
BASOPHILS NFR BLD AUTO: 0 %
BILIRUB SERPL-MCNC: 0.5 MG/DL (ref 0–1.2)
BUN SERPL-MCNC: 12 MG/DL (ref 6–24)
BUN/CREAT SERPL: 17 (ref 9–23)
CALCIUM SERPL-MCNC: 9 MG/DL (ref 8.7–10.2)
CHLORIDE SERPL-SCNC: 105 MMOL/L (ref 96–106)
CHOLEST SERPL-MCNC: 230 MG/DL (ref 100–199)
CHOLEST/HDLC SERPL: 6.2 RATIO (ref 0–4.4)
CO2 SERPL-SCNC: 16 MMOL/L (ref 20–29)
CREAT SERPL-MCNC: 0.69 MG/DL (ref 0.57–1)
EGFRCR SERPLBLD CKD-EPI 2021: 111 ML/MIN/1.73
EOSINOPHIL # BLD AUTO: 0.1 X10E3/UL (ref 0–0.4)
EOSINOPHIL NFR BLD AUTO: 1 %
ERYTHROCYTE [DISTWIDTH] IN BLOOD BY AUTOMATED COUNT: 12.9 % (ref 11.7–15.4)
EST. AVERAGE GLUCOSE BLD GHB EST-MCNC: 97 MG/DL
GLOBULIN SER CALC-MCNC: 2.9 G/DL (ref 1.5–4.5)
GLUCOSE SERPL-MCNC: 82 MG/DL (ref 70–99)
HBA1C MFR BLD: 5 % (ref 4.8–5.6)
HCT VFR BLD AUTO: 40.2 % (ref 34–46.6)
HDLC SERPL-MCNC: 37 MG/DL
HGB BLD-MCNC: 13.3 G/DL (ref 11.1–15.9)
IMM GRANULOCYTES # BLD AUTO: 0 X10E3/UL (ref 0–0.1)
IMM GRANULOCYTES NFR BLD AUTO: 0 %
LDLC SERPL CALC-MCNC: 143 MG/DL (ref 0–99)
LYMPHOCYTES # BLD AUTO: 2.3 X10E3/UL (ref 0.7–3.1)
LYMPHOCYTES NFR BLD AUTO: 29 %
MCH RBC QN AUTO: 29.4 PG (ref 26.6–33)
MCHC RBC AUTO-ENTMCNC: 33.1 G/DL (ref 31.5–35.7)
MCV RBC AUTO: 89 FL (ref 79–97)
MONOCYTES # BLD AUTO: 0.6 X10E3/UL (ref 0.1–0.9)
MONOCYTES NFR BLD AUTO: 7 %
NEUTROPHILS # BLD AUTO: 5 X10E3/UL (ref 1.4–7)
NEUTROPHILS NFR BLD AUTO: 63 %
PLATELET # BLD AUTO: 274 X10E3/UL (ref 150–450)
POTASSIUM SERPL-SCNC: 4.1 MMOL/L (ref 3.5–5.2)
PROT SERPL-MCNC: 7.1 G/DL (ref 6–8.5)
RBC # BLD AUTO: 4.53 X10E6/UL (ref 3.77–5.28)
SODIUM SERPL-SCNC: 139 MMOL/L (ref 134–144)
TRIGL SERPL-MCNC: 275 MG/DL (ref 0–149)
TSH SERPL DL<=0.005 MIU/L-ACNC: 2.49 UIU/ML (ref 0.45–4.5)
VLDLC SERPL CALC-MCNC: 50 MG/DL (ref 5–40)
WBC # BLD AUTO: 8 X10E3/UL (ref 3.4–10.8)

## 2025-07-28 ENCOUNTER — OFFICE VISIT (OUTPATIENT)
Age: 40
End: 2025-07-28

## 2025-07-28 DIAGNOSIS — Z00.00 WELLNESS EXAMINATION: Primary | ICD-10-CM

## 2025-07-28 DIAGNOSIS — Z71.2 ENCOUNTER TO DISCUSS TEST RESULTS: ICD-10-CM

## 2025-07-28 NOTE — PATIENT INSTRUCTIONS
How to Increase High-Density Lipoprotein (HDL) Cholesterol and lower your triglycerides:  High-density lipoprotein (HDL) cholesterol is sometimes called \"good cholesterol\" because higher levels are linked to a lower risk of heart disease.  Triglycerides are a type of fat in your blood. Your body uses them for energy. You need some for good health. But high triglyceride levels are linked with a higher risk of coronary artery disease. A high level may be a sign of metabolic syndrome. Very high levels raise your risk of pancreatitis.  Here are evidence-based steps to help increase HDL cholesterol and lower your triglycerides:   Exercise regularly. Aim for at least 30 minutes of brisk aerobic activity (like walking, jogging, cycling, or swimming) five times per week. More than 120 minutes per week is especially effective for raising HDL and lowering triglycerides.   Achieve and maintain a healthy weight. If overweight, losing weight can increase HDL and lower triglycerides. Even modest weight loss helps. A stable, lower weight is more effective than rapid weight loss, which may temporarily lower HDL.   Stop smoking. Quitting tobacco (including smokeless tobacco) can raise HDL by about 4 mg/dL and improve HDL function. Seek support if needed.   Eat a heart-healthy diet.  Replace saturated fats (found in butter, fatty meats) and trans fats (found in processed foods) with healthy fats like olive oil, canola oil, nuts, and fatty fish (salmon, tuna, mackerel).  Increase intake of whole grains, legumes, fruits, and vegetables.  Mediterranean-style diets, including virgin olive oil and nuts, may improve levels and function.  Limit added sugars and refined carbohydrates.   Drink alcohol only in moderation, if at all. Moderate alcohol intake (up to 1 drink per day for women, up to 2 for men) can increase HDL, but drinking more is not recommended. If you do not drink, do not start for this reason.

## 2025-07-28 NOTE — PROGRESS NOTES
and lower your triglycerides:  High-density lipoprotein (HDL) cholesterol is sometimes called \"good cholesterol\" because higher levels are linked to a lower risk of heart disease.  Triglycerides are a type of fat in your blood. Your body uses them for energy. You need some for good health. But high triglyceride levels are linked with a higher risk of coronary artery disease. A high level may be a sign of metabolic syndrome. Very high levels raise your risk of pancreatitis.  Here are evidence-based steps to help increase HDL cholesterol and lower your triglycerides:   Exercise regularly. Aim for at least 30 minutes of brisk aerobic activity (like walking, jogging, cycling, or swimming) five times per week. More than 120 minutes per week is especially effective for raising HDL and lowering triglycerides.   Achieve and maintain a healthy weight. If overweight, losing weight can increase HDL and lower triglycerides. Even modest weight loss helps. A stable, lower weight is more effective than rapid weight loss, which may temporarily lower HDL.   Stop smoking. Quitting tobacco (including smokeless tobacco) can raise HDL by about 4 mg/dL and improve HDL function. Seek support if needed.   Eat a heart-healthy diet.  Replace saturated fats (found in butter, fatty meats) and trans fats (found in processed foods) with healthy fats like olive oil, canola oil, nuts, and fatty fish (salmon, tuna, mackerel).  Increase intake of whole grains, legumes, fruits, and vegetables.  Mediterranean-style diets, including virgin olive oil and nuts, may improve levels and function.  Limit added sugars and refined carbohydrates.   Drink alcohol only in moderation, if at all. Moderate alcohol intake (up to 1 drink per day for women, up to 2 for men) can increase HDL, but drinking more is not recommended. If you do not drink, do not start for this reason.     Return in about 3 months (around 10/28/2025) for recheck lipid panel.  I have reviewed